# Patient Record
Sex: MALE | Race: WHITE | NOT HISPANIC OR LATINO
[De-identification: names, ages, dates, MRNs, and addresses within clinical notes are randomized per-mention and may not be internally consistent; named-entity substitution may affect disease eponyms.]

---

## 2017-03-21 ENCOUNTER — RECORDS - HEALTHEAST (OUTPATIENT)
Dept: ADMINISTRATIVE | Facility: OTHER | Age: 82
End: 2017-03-21

## 2017-04-07 ENCOUNTER — RECORDS - HEALTHEAST (OUTPATIENT)
Dept: GENERAL RADIOLOGY | Facility: CLINIC | Age: 82
End: 2017-04-07

## 2017-04-07 ENCOUNTER — OFFICE VISIT - HEALTHEAST (OUTPATIENT)
Dept: FAMILY MEDICINE | Facility: CLINIC | Age: 82
End: 2017-04-07

## 2017-04-07 DIAGNOSIS — L57.0 ACTINIC KERATOSIS: ICD-10-CM

## 2017-04-07 DIAGNOSIS — Z51.81 MEDICATION MONITORING ENCOUNTER: ICD-10-CM

## 2017-04-07 DIAGNOSIS — I63.512 CEREBRAL INFARCTION DUE TO OCCLUSION OF LEFT MIDDLE CEREBRAL ARTERY (H): ICD-10-CM

## 2017-04-07 DIAGNOSIS — I10 ESSENTIAL HYPERTENSION WITH GOAL BLOOD PRESSURE LESS THAN 140/90: ICD-10-CM

## 2017-04-07 DIAGNOSIS — K13.0 MUCOCELE OF LIP: ICD-10-CM

## 2017-04-07 DIAGNOSIS — I10 UNSPECIFIED ESSENTIAL HYPERTENSION: ICD-10-CM

## 2017-04-07 DIAGNOSIS — M17.9 OSTEOARTHRITIS OF KNEE, UNSPECIFIED: ICD-10-CM

## 2017-04-07 DIAGNOSIS — R73.01 IMPAIRED FASTING GLUCOSE: ICD-10-CM

## 2017-04-09 ENCOUNTER — COMMUNICATION - HEALTHEAST (OUTPATIENT)
Dept: FAMILY MEDICINE | Facility: CLINIC | Age: 82
End: 2017-04-09

## 2017-04-10 ENCOUNTER — RECORDS - HEALTHEAST (OUTPATIENT)
Dept: ADMINISTRATIVE | Facility: OTHER | Age: 82
End: 2017-04-10

## 2017-10-06 ENCOUNTER — COMMUNICATION - HEALTHEAST (OUTPATIENT)
Dept: FAMILY MEDICINE | Facility: CLINIC | Age: 82
End: 2017-10-06

## 2017-10-06 ENCOUNTER — OFFICE VISIT - HEALTHEAST (OUTPATIENT)
Dept: FAMILY MEDICINE | Facility: CLINIC | Age: 82
End: 2017-10-06

## 2017-10-06 DIAGNOSIS — E66.3 OVERWEIGHT (BMI 25.0-29.9): ICD-10-CM

## 2017-10-06 DIAGNOSIS — Z23 NEED FOR IMMUNIZATION AGAINST INFLUENZA: ICD-10-CM

## 2017-10-06 DIAGNOSIS — I10 ESSENTIAL HYPERTENSION WITH GOAL BLOOD PRESSURE LESS THAN 140/90: ICD-10-CM

## 2017-10-06 DIAGNOSIS — E78.5 DYSLIPIDEMIA: ICD-10-CM

## 2017-10-06 DIAGNOSIS — Z00.01 ENCOUNTER FOR GENERAL ADULT MEDICAL EXAMINATION WITH ABNORMAL FINDINGS: ICD-10-CM

## 2017-10-06 DIAGNOSIS — R73.01 IMPAIRED FASTING GLUCOSE: ICD-10-CM

## 2017-10-06 DIAGNOSIS — I63.512 CEREBRAL INFARCTION DUE TO OCCLUSION OF LEFT MIDDLE CEREBRAL ARTERY (H): ICD-10-CM

## 2017-10-06 DIAGNOSIS — I71.40 ANEURYSM OF ABDOMINAL VESSEL (H): ICD-10-CM

## 2017-10-06 LAB
CHOLEST SERPL-MCNC: 118 MG/DL
FASTING STATUS PATIENT QL REPORTED: YES
HDLC SERPL-MCNC: 42 MG/DL
LDLC SERPL CALC-MCNC: 60 MG/DL
TRIGL SERPL-MCNC: 80 MG/DL

## 2017-10-06 ASSESSMENT — MIFFLIN-ST. JEOR: SCORE: 1388.97

## 2017-10-10 ENCOUNTER — HOSPITAL ENCOUNTER (OUTPATIENT)
Dept: ULTRASOUND IMAGING | Facility: CLINIC | Age: 82
Discharge: HOME | End: 2017-10-10
Attending: FAMILY MEDICINE

## 2017-10-10 DIAGNOSIS — I71.40 ANEURYSM OF ABDOMINAL VESSEL (H): ICD-10-CM

## 2017-10-11 ENCOUNTER — COMMUNICATION - HEALTHEAST (OUTPATIENT)
Dept: FAMILY MEDICINE | Facility: CLINIC | Age: 82
End: 2017-10-11

## 2017-10-26 ENCOUNTER — COMMUNICATION - HEALTHEAST (OUTPATIENT)
Dept: FAMILY MEDICINE | Facility: CLINIC | Age: 82
End: 2017-10-26

## 2017-10-26 DIAGNOSIS — I10 ESSENTIAL HYPERTENSION: ICD-10-CM

## 2018-04-02 ENCOUNTER — RECORDS - HEALTHEAST (OUTPATIENT)
Dept: ADMINISTRATIVE | Facility: OTHER | Age: 83
End: 2018-04-02

## 2018-04-10 ENCOUNTER — COMMUNICATION - HEALTHEAST (OUTPATIENT)
Dept: FAMILY MEDICINE | Facility: CLINIC | Age: 83
End: 2018-04-10

## 2018-04-10 ENCOUNTER — OFFICE VISIT - HEALTHEAST (OUTPATIENT)
Dept: FAMILY MEDICINE | Facility: CLINIC | Age: 83
End: 2018-04-10

## 2018-04-10 DIAGNOSIS — R73.01 IMPAIRED FASTING GLUCOSE: ICD-10-CM

## 2018-04-10 DIAGNOSIS — I10 ESSENTIAL HYPERTENSION WITH GOAL BLOOD PRESSURE LESS THAN 140/90: ICD-10-CM

## 2018-04-10 DIAGNOSIS — I63.512 CEREBRAL INFARCTION DUE TO OCCLUSION OF LEFT MIDDLE CEREBRAL ARTERY (H): ICD-10-CM

## 2018-04-10 DIAGNOSIS — E78.5 DYSLIPIDEMIA: ICD-10-CM

## 2018-04-10 LAB
ANION GAP SERPL CALCULATED.3IONS-SCNC: 10 MMOL/L (ref 5–18)
BUN SERPL-MCNC: 20 MG/DL (ref 8–28)
CALCIUM SERPL-MCNC: 10 MG/DL (ref 8.5–10.5)
CHLORIDE BLD-SCNC: 103 MMOL/L (ref 98–107)
CHOLEST SERPL-MCNC: 115 MG/DL
CO2 SERPL-SCNC: 28 MMOL/L (ref 22–31)
CREAT SERPL-MCNC: 1.19 MG/DL (ref 0.7–1.3)
FASTING STATUS PATIENT QL REPORTED: YES
GFR SERPL CREATININE-BSD FRML MDRD: 58 ML/MIN/1.73M2
GLUCOSE BLD-MCNC: 95 MG/DL (ref 70–125)
HBA1C MFR BLD: 5.8 % (ref 3.5–6)
HDLC SERPL-MCNC: 47 MG/DL
LDLC SERPL CALC-MCNC: 60 MG/DL
POTASSIUM BLD-SCNC: 4.1 MMOL/L (ref 3.5–5)
SODIUM SERPL-SCNC: 141 MMOL/L (ref 136–145)
TRIGL SERPL-MCNC: 38 MG/DL

## 2018-04-30 ENCOUNTER — COMMUNICATION - HEALTHEAST (OUTPATIENT)
Dept: FAMILY MEDICINE | Facility: CLINIC | Age: 83
End: 2018-04-30

## 2018-04-30 DIAGNOSIS — I10 ESSENTIAL HYPERTENSION: ICD-10-CM

## 2018-10-12 ENCOUNTER — OFFICE VISIT - HEALTHEAST (OUTPATIENT)
Dept: FAMILY MEDICINE | Facility: CLINIC | Age: 83
End: 2018-10-12

## 2018-10-12 DIAGNOSIS — Z23 NEED FOR VACCINATION: ICD-10-CM

## 2018-10-12 DIAGNOSIS — I63.512 CEREBRAL INFARCTION DUE TO OCCLUSION OF LEFT MIDDLE CEREBRAL ARTERY (H): ICD-10-CM

## 2018-10-12 DIAGNOSIS — N28.9 RENAL INSUFFICIENCY: ICD-10-CM

## 2018-10-12 DIAGNOSIS — E78.5 DYSLIPIDEMIA: ICD-10-CM

## 2018-10-12 DIAGNOSIS — E66.3 OVERWEIGHT (BMI 25.0-29.9): ICD-10-CM

## 2018-10-12 DIAGNOSIS — R47.01 APHASIA: ICD-10-CM

## 2018-10-12 DIAGNOSIS — I10 ESSENTIAL HYPERTENSION WITH GOAL BLOOD PRESSURE LESS THAN 140/90: ICD-10-CM

## 2018-10-12 DIAGNOSIS — Z00.01 ENCOUNTER FOR GENERAL ADULT MEDICAL EXAMINATION WITH ABNORMAL FINDINGS: ICD-10-CM

## 2018-10-12 DIAGNOSIS — M17.0 OSTEOARTHRITIS OF BOTH KNEES, UNSPECIFIED OSTEOARTHRITIS TYPE: ICD-10-CM

## 2018-10-12 DIAGNOSIS — R73.01 IMPAIRED FASTING GLUCOSE: ICD-10-CM

## 2018-10-12 DIAGNOSIS — H91.93 BILATERAL HEARING LOSS, UNSPECIFIED HEARING LOSS TYPE: ICD-10-CM

## 2018-10-12 LAB
ANION GAP SERPL CALCULATED.3IONS-SCNC: 12 MMOL/L (ref 5–18)
BUN SERPL-MCNC: 14 MG/DL (ref 8–28)
CALCIUM SERPL-MCNC: 10.3 MG/DL (ref 8.5–10.5)
CHLORIDE BLD-SCNC: 104 MMOL/L (ref 98–107)
CHOLEST SERPL-MCNC: 127 MG/DL
CO2 SERPL-SCNC: 28 MMOL/L (ref 22–31)
CREAT SERPL-MCNC: 0.98 MG/DL (ref 0.7–1.3)
FASTING STATUS PATIENT QL REPORTED: YES
GFR SERPL CREATININE-BSD FRML MDRD: >60 ML/MIN/1.73M2
GLUCOSE BLD-MCNC: 103 MG/DL (ref 70–125)
HBA1C MFR BLD: 5.7 % (ref 3.5–6)
HDLC SERPL-MCNC: 47 MG/DL
LDLC SERPL CALC-MCNC: 63 MG/DL
POTASSIUM BLD-SCNC: 4.1 MMOL/L (ref 3.5–5)
SODIUM SERPL-SCNC: 144 MMOL/L (ref 136–145)
TRIGL SERPL-MCNC: 87 MG/DL

## 2018-10-12 RX ORDER — ASPIRIN 81 MG/1
81 TABLET, CHEWABLE ORAL DAILY
Qty: 30 TABLET | Refills: 11 | Status: SHIPPED | COMMUNITY
Start: 2018-10-12 | End: 2020-02-16

## 2018-10-12 ASSESSMENT — MIFFLIN-ST. JEOR: SCORE: 1352.68

## 2018-10-15 ENCOUNTER — COMMUNICATION - HEALTHEAST (OUTPATIENT)
Dept: FAMILY MEDICINE | Facility: CLINIC | Age: 83
End: 2018-10-15

## 2019-04-12 ENCOUNTER — OFFICE VISIT - HEALTHEAST (OUTPATIENT)
Dept: FAMILY MEDICINE | Facility: CLINIC | Age: 84
End: 2019-04-12

## 2019-04-12 DIAGNOSIS — E78.5 DYSLIPIDEMIA: ICD-10-CM

## 2019-04-12 DIAGNOSIS — I63.512 CEREBRAL INFARCTION DUE TO OCCLUSION OF LEFT MIDDLE CEREBRAL ARTERY (H): ICD-10-CM

## 2019-04-12 DIAGNOSIS — R73.01 IMPAIRED FASTING GLUCOSE: ICD-10-CM

## 2019-04-12 DIAGNOSIS — I10 ESSENTIAL HYPERTENSION WITH GOAL BLOOD PRESSURE LESS THAN 140/90: ICD-10-CM

## 2019-04-12 DIAGNOSIS — E66.3 OVERWEIGHT (BMI 25.0-29.9): ICD-10-CM

## 2019-04-12 LAB
ANION GAP SERPL CALCULATED.3IONS-SCNC: 9 MMOL/L (ref 5–18)
BUN SERPL-MCNC: 20 MG/DL (ref 8–28)
CALCIUM SERPL-MCNC: 10.4 MG/DL (ref 8.5–10.5)
CHLORIDE BLD-SCNC: 104 MMOL/L (ref 98–107)
CHOLEST SERPL-MCNC: 124 MG/DL
CO2 SERPL-SCNC: 29 MMOL/L (ref 22–31)
CREAT SERPL-MCNC: 0.97 MG/DL (ref 0.7–1.3)
FASTING STATUS PATIENT QL REPORTED: YES
GFR SERPL CREATININE-BSD FRML MDRD: >60 ML/MIN/1.73M2
GLUCOSE BLD-MCNC: 99 MG/DL (ref 70–125)
HBA1C MFR BLD: 5.5 % (ref 3.5–6)
HDLC SERPL-MCNC: 47 MG/DL
LDLC SERPL CALC-MCNC: 59 MG/DL
POTASSIUM BLD-SCNC: 4.1 MMOL/L (ref 3.5–5)
SODIUM SERPL-SCNC: 142 MMOL/L (ref 136–145)
TRIGL SERPL-MCNC: 88 MG/DL

## 2019-04-13 ENCOUNTER — COMMUNICATION - HEALTHEAST (OUTPATIENT)
Dept: FAMILY MEDICINE | Facility: CLINIC | Age: 84
End: 2019-04-13

## 2019-10-18 ENCOUNTER — OFFICE VISIT - HEALTHEAST (OUTPATIENT)
Dept: FAMILY MEDICINE | Facility: CLINIC | Age: 84
End: 2019-10-18

## 2019-10-18 DIAGNOSIS — I63.512 CEREBRAL INFARCTION DUE TO OCCLUSION OF LEFT MIDDLE CEREBRAL ARTERY (H): ICD-10-CM

## 2019-10-18 DIAGNOSIS — Z00.01 ENCOUNTER FOR GENERAL ADULT MEDICAL EXAMINATION WITH ABNORMAL FINDINGS: ICD-10-CM

## 2019-10-18 DIAGNOSIS — I10 ESSENTIAL HYPERTENSION WITH GOAL BLOOD PRESSURE LESS THAN 140/90: ICD-10-CM

## 2019-10-18 DIAGNOSIS — H91.93 BILATERAL HEARING LOSS, UNSPECIFIED HEARING LOSS TYPE: ICD-10-CM

## 2019-10-18 DIAGNOSIS — R73.01 IMPAIRED FASTING GLUCOSE: ICD-10-CM

## 2019-10-18 DIAGNOSIS — I71.40 ANEURYSM OF ABDOMINAL VESSEL (H): ICD-10-CM

## 2019-10-18 DIAGNOSIS — E78.5 DYSLIPIDEMIA: ICD-10-CM

## 2019-10-18 DIAGNOSIS — E66.3 OVERWEIGHT (BMI 25.0-29.9): ICD-10-CM

## 2019-10-18 LAB
ANION GAP SERPL CALCULATED.3IONS-SCNC: 9 MMOL/L (ref 5–18)
BUN SERPL-MCNC: 18 MG/DL (ref 8–28)
CALCIUM SERPL-MCNC: 10.3 MG/DL (ref 8.5–10.5)
CHLORIDE BLD-SCNC: 106 MMOL/L (ref 98–107)
CHOLEST SERPL-MCNC: 124 MG/DL
CO2 SERPL-SCNC: 28 MMOL/L (ref 22–31)
CREAT SERPL-MCNC: 1.09 MG/DL (ref 0.7–1.3)
FASTING STATUS PATIENT QL REPORTED: YES
GFR SERPL CREATININE-BSD FRML MDRD: >60 ML/MIN/1.73M2
GLUCOSE BLD-MCNC: 109 MG/DL (ref 70–125)
HBA1C MFR BLD: 5.5 % (ref 3.5–6)
HDLC SERPL-MCNC: 49 MG/DL
LDLC SERPL CALC-MCNC: 59 MG/DL
POTASSIUM BLD-SCNC: 4 MMOL/L (ref 3.5–5)
SODIUM SERPL-SCNC: 143 MMOL/L (ref 136–145)
TRIGL SERPL-MCNC: 78 MG/DL

## 2019-10-18 RX ORDER — AMLODIPINE BESYLATE 10 MG/1
10 TABLET ORAL DAILY
Qty: 90 TABLET | Refills: 3 | Status: SHIPPED | OUTPATIENT
Start: 2019-10-18

## 2019-10-18 RX ORDER — SIMVASTATIN 20 MG
20 TABLET ORAL DAILY
Qty: 90 TABLET | Refills: 3 | Status: SHIPPED | OUTPATIENT
Start: 2019-10-18

## 2019-10-18 RX ORDER — HYDROCHLOROTHIAZIDE 25 MG/1
25 TABLET ORAL DAILY
Qty: 90 TABLET | Refills: 3 | Status: SHIPPED | OUTPATIENT
Start: 2019-10-18

## 2019-10-18 ASSESSMENT — MIFFLIN-ST. JEOR: SCORE: 1342.91

## 2019-10-18 ASSESSMENT — ANXIETY QUESTIONNAIRES
1. FEELING NERVOUS, ANXIOUS, OR ON EDGE: NOT AT ALL
2. NOT BEING ABLE TO STOP OR CONTROL WORRYING: NOT AT ALL

## 2019-10-21 ENCOUNTER — COMMUNICATION - HEALTHEAST (OUTPATIENT)
Dept: FAMILY MEDICINE | Facility: CLINIC | Age: 84
End: 2019-10-21

## 2019-10-21 ENCOUNTER — RECORDS - HEALTHEAST (OUTPATIENT)
Dept: VASCULAR ULTRASOUND | Facility: CLINIC | Age: 84
End: 2019-10-21

## 2019-10-21 DIAGNOSIS — I71.40 ABDOMINAL AORTIC ANEURYSM, WITHOUT RUPTURE: ICD-10-CM

## 2020-02-24 ENCOUNTER — COMMUNICATION - HEALTHEAST (OUTPATIENT)
Dept: FAMILY MEDICINE | Facility: CLINIC | Age: 85
End: 2020-02-24

## 2020-02-25 ENCOUNTER — COMMUNICATION - HEALTHEAST (OUTPATIENT)
Dept: FAMILY MEDICINE | Facility: CLINIC | Age: 85
End: 2020-02-25

## 2021-05-27 ENCOUNTER — RECORDS - HEALTHEAST (OUTPATIENT)
Dept: ADMINISTRATIVE | Facility: CLINIC | Age: 86
End: 2021-05-27

## 2021-05-27 NOTE — PROGRESS NOTES
Assessment/Plan:    1. Essential hypertension with goal blood pressure less than 140/90  Hypertension, stable.  Blood pressure 120/70 on recheck.  Refill on amlodipine 10 mg daily and hydrochlorothiazide 25 mg daily.  Basic metabolic panel for medication monitoring.  Has had mild renal insufficiency intermittently previously.  Ensure adequate hydration.  Reassess at annual wellness visit in 6 months.  - Basic Metabolic Panel  - hydroCHLOROthiazide (HYDRODIURIL) 25 MG tablet; Take 1 tablet (25 mg total) by mouth daily.  Dispense: 90 tablet; Refill: 3  - amLODIPine (NORVASC) 10 MG tablet; Take 1 tablet (10 mg total) by mouth daily.  Dispense: 90 tablet; Refill: 3    2. Impaired fasting glucose  Impaired fasting glucose history.  Prior A1c improved from 5.8% down to 5.7%.  Repeat A1c and fasting glucose with goal less than 100.  Therapeutic lifestyle changes for weight goal remaining less than 160 pounds initially, less than 155 pounds ideally.  - Basic Metabolic Panel  - Glycosylated Hemoglobin A1c    3. Dyslipidemia  History of dyslipidemia with history of left middle cerebral artery CVA January 2016 without residual deficit.  Lipid cascade obtained today.  Continue simvastatin 20 mg at bedtime.  - Lipid Cascade  - simvastatin (ZOCOR) 20 MG tablet; Take 1 tablet (20 mg total) by mouth daily.  Dispense: 90 tablet; Refill: 3    4. Overweight (BMI 25.0-29.9)  Therapeutic lifestyle changes reviewed for weight goal remaining less than 160 pounds initially, less than 155 pounds ideally.    5. 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  No recurrent concerns or residual deficits.      The following high BMI interventions were performed this visit: encouragement to exercise, weight monitoring, weight loss from baseline weight and lifestyle education regarding diet.  Ensure ongoing efforts to achieve weight goal < 160 pounds initially, < 155 pounds ideally.  "        Subjective:    Daryl Casey is seen today for follow-up evaluation.  History of hypertension.  Continues amlodipine 10 mg daily and hydrochlorothiazide 25 mg daily.  No side effects of medication.  No dizziness.  History of left middle cerebral artery CVA in 2016 without residual deficit.  Continues aspirin 81 mg daily.  Using simvastatin 20 mg at bedtime for management of dyslipidemia.  Has had mild renal insufficiency intermittently in the past however most recent renal function appeared normal.  Weight relatively stable since prior physical 2018.  Denies chest pain.  Rash involving right anterior shin distally.  Improved with hydrocortisone cream.  Mild pruritus initially.  No other skin involvement.  Comprehensive review of systems as above otherwise all negative.     \"Kacey\" x [birth year]   5 children   Retired - product supervisor   [employer]   Mom - [facility] dec 91   Dad - dec 75 ( in [facility])   3 bros - DM, HTN   Identical twin brother   No smoke  Infrequent EtOH (wine) - glass on holidays   Hospitalizations: 3/03 - [facility] for rectal bleeding; 16 admitted to the hospital for confusion and aphasia and noted to have a left middle cerebral artery stroke  Surgeries: s/p left knee surgery ~ 40 years ago; s/p suprapubic prostatectomy 08 - pathology without cancer; s/p right inferior eyelid eyelash surgery; s/p kidney stone removal on cystoscopy in office by Dr. Martel 11/3/11; left eye cataract 12; right eye cataract 12; 1/23/15 bilateral blepharoplasty  Colonoscopy 3/24/03 (repeat in 10 years)   Uses bilateral hearing aids   3 cm infrarenal AAA noted on CT scan 11/3/2011 -> follow with repeat U/S monitoring in 2-3 years to ensure stable (ultrasound on 10/18/13 showing stable 3.2 cm infrarenal AAA)  H/O [facility] Shingles study     16 FYI: Please note that Mr. Casey was admitted to the hospital for confusion and aphasia and noted to have a left " middle cerebral artery stroke. He is actually improving nicely and his aphasia is much improved he is going to go to [facility] for transitional care and will follow up with you afterwards. I did ask that [facility] follow while he is there. We ve kept his blood pressure medications the same, we added aspirin on a daily basis we also started him on a statin medication he will need his lipid profile checked in about six weeks. He will follow with Dr. Sneed in approximately four weeks. If you have any questions you can contact the hospitalist coordinator at [phone] and have me paged. Dr. Finney    Past Surgical History:   Procedure Laterality Date     CATARACT EXTRACTION Bilateral      Eyelid Surgery Right      KNEE SURGERY Left      PROSTATE SURGERY       PROSTATE SURGERY          Family History   Problem Relation Age of Onset     Heart disease Mother      Diabetes Brother      Hypertension Brother         Past Medical History:   Diagnosis Date     Aneurysm of infrarenal abdominal aorta (H)      BPH (benign prostatic hyperplasia)      Cataract     prior surgery     Diverticulosis      [place] (hard of hearing)      Hypertension      Insomnia      OA (osteoarthritis) of knee      PVC (premature ventricular contraction)      Right inguinal hernia         Social History     Tobacco Use     Smoking status: Former Smoker     Last attempt to quit: 1965     Years since quittin.2     Smokeless tobacco: Never Used   Substance Use Topics     Alcohol use: Yes     Comment: On occasions     Drug use: No        Current Outpatient Medications   Medication Sig Dispense Refill     acetaminophen (TYLENOL) 325 MG tablet Take 650 mg by mouth every 4 (four) hours as needed for pain.       amLODIPine (NORVASC) 10 MG tablet Take 1 tablet (10 mg total) by mouth daily. 90 tablet 3     ASCORBATE CALCIUM (VITAMIN C ORAL) Take 250 mg by mouth daily.        calcium, as carbonate, (OS-ALMA) 500 mg  calcium (1,250 mg) tablet Take 1 tablet by mouth daily.        cyanocobalamin (VITAMIN B-12) 1000 MCG tablet Take 1,000 mcg by mouth daily. As directed.       hydroCHLOROthiazide (HYDRODIURIL) 25 MG tablet Take 1 tablet (25 mg total) by mouth daily. 90 tablet 3     multivitamin therapeutic tablet Take 1 tablet by mouth daily.       simvastatin (ZOCOR) 20 MG tablet Take 1 tablet (20 mg total) by mouth daily. 90 tablet 3     aspirin 81 mg chewable tablet Chew 1 tablet (81 mg total) daily. 30 tablet 11     No current facility-administered medications for this visit.           Objective:    Vitals:    04/12/19 0712   BP: 120/70   Pulse: 80   SpO2: 97%   Weight: 163 lb (73.9 kg)      Body mass index is 26.51 kg/m .    Alert.  No apparent distress.  Right anterior shin distal aspect with erythematous rash without excoriation or drainage.  No peripheral edema.  Chest clear.  Cardiac exam regular with blood pressure 120/70 on recheck as well.      This note has been dictated using voice recognition software and as a result may contain minor grammatical errors and unintended word substitutions.

## 2021-05-30 VITALS — WEIGHT: 175 LBS | BODY MASS INDEX: 26.61 KG/M2

## 2021-05-31 VITALS — BODY MASS INDEX: 27.64 KG/M2 | HEIGHT: 66 IN | WEIGHT: 172 LBS

## 2021-06-01 VITALS — BODY MASS INDEX: 27.32 KG/M2 | WEIGHT: 168 LBS

## 2021-06-02 VITALS — BODY MASS INDEX: 26.51 KG/M2 | WEIGHT: 163 LBS

## 2021-06-02 VITALS — BODY MASS INDEX: 26.36 KG/M2 | HEIGHT: 66 IN | WEIGHT: 164 LBS

## 2021-06-02 NOTE — PROGRESS NOTES
Right knee Injection  Date/Time: 10/18/2019 8:21 AM  Performed by: Darren Tripathi MD  Authorized by: Darren Tripathi MD       Universal Protocol    Site marked: Yes    Prior images obtained and reviewed: Yes    Required items: required blood products, implants, devices, and special equipment available    Patient identity confirmed: verbally with patient    Reevaluation: Patient was reevaluated immediately before administering moderate or deep sedation or anesthesia    Confirmation checklist: patient's identity using two indicators    Time out: Immediately prior to procedure a time out was called to verify the correct patient, procedure, equipment, support staff and site/side marked as required    Universal Protocol: Joint Commission Universal Protocol was followed    Preparation: Patient was prepped and draped in the usual sterile fashion    Indications  Indications: pain     Location  Body area: knee  Joint: right knee      Anesthesia    Local anesthesia used?: Yes    Local anesthetic: lidocaine 1% without epinephrine    Anesthetic total (mL): 2    Sedation  Patient sedation: No    Procedure Details  Needle size: 22 G  Ultrasound guidance: no  Approach: superior  Methylprednisolone amount: 80 mg  Lidocaine 1% amount: 3 mL      Post-procedure   Length of time physician present for 1:1 monitoring during sedation: 0

## 2021-06-02 NOTE — TELEPHONE ENCOUNTER
Reason contacted:  Results   Information relayed:  Below message relayed to patients spouse per patient request.   Additional questions:  No  Further follow-up needed:  No  Okay to leave a detailed message:  No

## 2021-06-02 NOTE — PROGRESS NOTES
Assessment and Plan:       1. Encounter for general adult medical examination with abnormal findings  Annual wellness visit completed.  Risks associate with hearing loss for which patient utilizes bilateral hearing aids otherwise no significant risks identified.  High-dose flu shot provided.  Did recommend Shingrix immunization series through local pharmacy.  Annual wellness visits to continue.  - Influenza High Dose, Seasonal 65+ yrs    2. Overweight (BMI 25.0-29.9)  Dietary and exercise modification for weight goal less than 155 pounds ideally.    3. Essential hypertension with goal blood pressure less than 140/90  Did provide refills on amlodipine 10 mg daily and hydrochlorothiazide 25 mg daily for hypertension management.  Base metabolic panel for med monitoring.  - Basic Metabolic Panel  - amLODIPine (NORVASC) 10 MG tablet; Take 1 tablet (10 mg total) by mouth daily.  Dispense: 90 tablet; Refill: 3  - hydroCHLOROthiazide (HYDRODIURIL) 25 MG tablet; Take 1 tablet (25 mg total) by mouth daily.  Dispense: 90 tablet; Refill: 3    4. Impaired fasting glucose  History of impaired fasting glucose.  Repeat fasting glucose and A1c.  Maintain weight less than 155 pounds ideally.  - Basic Metabolic Panel  - Glycosylated Hemoglobin A1c    5. Dyslipidemia  History of dyslipidemia.  Simvastatin 20 mg at bedtime refilled.  Check lipid cascade.  - Lipid Cascade  - simvastatin (ZOCOR) 20 MG tablet; Take 1 tablet (20 mg total) by mouth daily.  Dispense: 90 tablet; Refill: 3    6. 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  Continues aspirin 81 mg daily.    7. Bilateral hearing loss, unspecified hearing loss type  Bilateral hearing aids utilized.    8. Osteoarthritis Of The Knee  Bilateral knee osteoarthritis.  Right knee pain described.  Has had corticosteroid injections in the past the orthopedic  specialist.  Request corticosteroid injection today.  Please see procedure note.  - methylPREDNISolone acetate injection 80 mg (DEPO-MEDROL)  - Right knee Injection    9. Aneurysm Of The Infrarenal Abdominal Aorta  History of 3.6 cm infrarenal AAA.  Most recent ultrasound 2017.  Repeat ultrasound scheduled.  - US Abdominal Aorta; Future        The patient's current medical problems were reviewed.    I have had an Advance Directives discussion with the patient.  The following high BMI interventions were performed this visit: encouragement to exercise, weight monitoring, weight loss from baseline weight and lifestyle education regarding diet.  Ensure ongoing efforts to achieve weight goal < 155 pounds ideally.     The following health maintenance schedule was reviewed with the patient and provided in printed form in the after visit summary:   Health Maintenance   Topic Date Due     ZOSTER VACCINES (2 of 3) 02/26/2003     INFLUENZA VACCINE RULE BASED (1) 08/01/2019     MEDICARE ANNUAL WELLNESS VISIT  10/12/2019     FALL RISK ASSESSMENT  10/18/2020     ADVANCE CARE PLANNING  10/12/2023     TD 18+ HE  10/12/2028     PNEUMOCOCCAL IMMUNIZATION 65+ LOW/MEDIUM RISK  Completed        Subjective:     Chief Complaint: Daryl Casey is an 86 y.o. male here for an Annual Wellness visit.     HPI: Patient seen today for annual wellness visit.  Wants steroid injection for right knee pain associate with known osteoarthritis.  Has received injections previously through the orthopedic specialist office likely greater than 1 year ago.  No history of hypertension.  Did have left middle cerebral artery CVA January 2016 without any residual concerns at this time.  Continues aspirin 81 mg daily.  Continues amlodipine 10 mg and hydrochlorothiazide 25 mg daily.  Simvastatin 20 mg at bedtime for lipid management with dyslipidemia history.  3.6 cm infrarenal AAA previously with most recent ultrasound 2017.  Hearing loss utilizes bilateral  "hearing aids.  Long-standing ventral hernia without significant distress.  Impaired fasting glucose history.  Has lost some weight since prior visit with goal less than 155 pounds reviewed.  4 pound weight loss since 2019.    Review of Systems:  Please see above.  The rest of the review of systems are negative for all systems.     \"Kacey\" x [birth year]   5 children   Retired - product supervisor   [employer]   Mom - CHF dec 91   Dad - dec 75 ( in [facility])   3 bros originally - DM, HTN; two  (one from lung CA, other at age 90 \"old age\")  Identical twin brother  No smoke  Infrequent EtOH (wine) - glass on holidays   Hospitalizations: 3/03 - [facility] for rectal bleeding; 16 admitted to the hospital for confusion and aphasia and noted to have a left middle cerebral artery stroke  Surgeries: s/p left knee surgery ~ 40 years ago; s/p suprapubic prostatectomy 08 - pathology without cancer; s/p right inferior eyelid eyelash surgery; s/p kidney stone removal on cystoscopy in office by Dr. Martel 11/3/11; left eye cataract 12; right eye cataract 12; 1/23/15 bilateral blepharoplasty  Colonoscopy 3/24/03 (repeat in 10 years)   Uses bilateral hearing aids   3 cm infrarenal AAA noted on CT scan 11/3/2011 -> follow with repeat U/S monitoring in 2-3 years to ensure stable (ultrasound on 10/18/13 showing stable 3.2 cm infrarenal AAA)  H/O [facility] study     Patient Care Team:  Darren Tripathi MD as PCP - General  Darren Tripathi MD as Assigned PCP     Patient Active Problem List   Diagnosis     Bilateral hearing loss, unspecified hearing loss type     Diverticulosis     Ventral Hernia     Essential hypertension with goal blood pressure less than 140/90     Aneurysm Of The Infrarenal Abdominal Aorta     Right Inguinal Hernia     Nephrolithiasis     Impaired Fasting Glucose     Scrotal Varicocele     Overweight (BMI 25.0-29.9)     Aphasia     Cerebral infarction due to " occlusion of left middle cerebral artery (H)     Insomnia     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     Encephalopathy     PVC (premature ventricular contraction)     Osteoarthritis of both knees, unspecified osteoarthritis type     Dyslipidemia     Renal insufficiency     Past Medical History:   Diagnosis Date     Aneurysm of infrarenal abdominal aorta (H)      BPH (benign prostatic hyperplasia)      Cataract     prior surgery     Diverticulosis      [place] (hard of hearing)      Hypertension      Insomnia      OA (osteoarthritis) of knee      PVC (premature ventricular contraction)      Right inguinal hernia       Past Surgical History:   Procedure Laterality Date     CATARACT EXTRACTION Bilateral      Eyelid Surgery Right      KNEE SURGERY Left      PROSTATE SURGERY       PROSTATE SURGERY        Family History   Problem Relation Age of Onset     Heart disease Mother      Diabetes Brother      Hypertension Brother       Social History     Socioeconomic History     Marital status:      Spouse name: Not on file     Number of children: Not on file     Years of education: Not on file     Highest education level: Not on file   Occupational History     Not on file   Social Needs     Financial resource strain: Not on file     Food insecurity:     Worry: Not on file     Inability: Not on file     Transportation needs:     Medical: Not on file     Non-medical: Not on file   Tobacco Use     Smoking status: Former Smoker     Last attempt to quit: 1965     Years since quittin.7     Smokeless tobacco: Never Used   Substance and Sexual Activity     Alcohol use: Yes     Comment: On occasions     Drug use: No     Sexual activity: Not on file   Lifestyle     Physical activity:     Days per week: Not on file     Minutes per session: Not on file     Stress: Not on file   Relationships     Social connections:     Talks on phone: Not on file     Gets together: Not on file     Attends Mosque  "service: Not on file     Active member of club or organization: Not on file     Attends meetings of clubs or organizations: Not on file     Relationship status: Not on file     Intimate partner violence:     Fear of current or ex partner: Not on file     Emotionally abused: Not on file     Physically abused: Not on file     Forced sexual activity: Not on file   Other Topics Concern     Not on file   Social History Narrative     Not on file      Current Outpatient Medications   Medication Sig Dispense Refill     acetaminophen (TYLENOL) 325 MG tablet Take 650 mg by mouth every 4 (four) hours as needed for pain.       amLODIPine (NORVASC) 10 MG tablet Take 1 tablet (10 mg total) by mouth daily. 90 tablet 3     ASCORBATE CALCIUM (VITAMIN C ORAL) Take 250 mg by mouth daily.        calcium, as carbonate, (OS-ALMA) 500 mg calcium (1,250 mg) tablet Take 1 tablet by mouth daily.        cyanocobalamin (VITAMIN B-12) 1000 MCG tablet Take 1,000 mcg by mouth daily. As directed.       hydroCHLOROthiazide (HYDRODIURIL) 25 MG tablet Take 1 tablet (25 mg total) by mouth daily. 90 tablet 3     multivitamin therapeutic tablet Take 1 tablet by mouth daily.       simvastatin (ZOCOR) 20 MG tablet Take 1 tablet (20 mg total) by mouth daily. 90 tablet 3     aspirin 81 mg chewable tablet Chew 1 tablet (81 mg total) daily. 30 tablet 11     No current facility-administered medications for this visit.       Objective:   Vital Signs:   Visit Vitals  /80 (Patient Site: Left Arm, Patient Position: Sitting, Cuff Size: Adult Regular)   Pulse 78   Ht 5' 6.54\" (1.69 m)   Wt 159 lb 1.6 oz (72.2 kg)   SpO2 95%   BMI 25.27 kg/m         VisionScreening:  No exam data present     PHYSICAL EXAM    General Appearance:    Alert, cooperative, no distress, appears stated age.     Head:    Normocephalic, without obvious abnormality, atraumatic   Eyes:    PERRL, conjunctiva/corneas clear, EOM's intact, fundi     benign, both eyes        Ears:    Normal TM's " and external ear canals, both ears.  Bilateral hearing aids in place.   Nose:   Nares normal, septum midline, mucosa normal, no drainage    or sinus tenderness   Throat:   Lips, mucosa, and tongue normal; teeth and gums normal   Neck:   Supple, symmetrical, trachea midline, no adenopathy;        thyroid:  No enlargement/tenderness/nodules; no carotid    bruit or JVD   Back:     Symmetric, no curvature, ROM normal, no CVA tenderness   Lungs:     Clear to auscultation bilaterally, respirations unlabored   Chest wall:    No tenderness or deformity   Heart:    Regular rate and rhythm, S1 and S2 normal, no murmur, rub   or gallop   Abdomen:     Soft, non-tender, bowel sounds active all four quadrants,     no masses, no organomegaly.  Ventral hernia, reducible.    Genitalia:    Normal male without lesion, discharge or tenderness.  No inguinal hernia noted.     Rectal:    Normal tone.  Prostate normal/symmetric, no masses or tenderness.   Extremities:   Extremities normal, atraumatic, no cyanosis or edema.  Bilateral knee osteoarthritic change noted symmetric without joint effusion.   Pulses:   2+ and symmetric all extremities   Skin:   Skin color, texture, turgor normal, no rashes or lesions   Lymph nodes:   Cervical, supraclavicular, and axillary nodes normal   Neurologic:   CNII-XII intact. Normal strength, sensation and reflexes       throughout        Assessment Results 10/18/2019   Activities of Daily Living No help needed   Instrumental Activities of Daily Living No help needed   Get Up and Go Score -   Mini Cog Total Score 5   Some recent data might be hidden     A Mini-Cog score of 0-2 suggests the possibility of dementia, score of 3-5 suggests no dementia    Identified Health Risks:     The patient was provided with written information regarding signs of hearing loss.  Patient's advanced directive was discussed and I am comfortable with the patient's wishes.

## 2021-06-02 NOTE — TELEPHONE ENCOUNTER
----- Message from Darren Tripathi MD sent at 10/21/2019  9:37 AM CDT -----  Notify patient that his ultrasound shows relatively stable aneurysm of aorta.  We will repeat this test in 2 years.

## 2021-06-03 VITALS
HEIGHT: 67 IN | DIASTOLIC BLOOD PRESSURE: 80 MMHG | OXYGEN SATURATION: 95 % | SYSTOLIC BLOOD PRESSURE: 120 MMHG | HEART RATE: 78 BPM | BODY MASS INDEX: 24.97 KG/M2 | WEIGHT: 159.1 LBS

## 2021-06-06 NOTE — TELEPHONE ENCOUNTER
FYI - Status Update  Who is Calling: Dai from [facility] Medical Examiner's office  Update: Caller was checking the status below. Caller was informed the message is still pending and waiting for Darren Tripathi MD to address. Caller was informed Darren Tripathi MD was not in yesterday but is here today.    Caller stated she wanted it noted that they are just waiting for Darren Tripathi MD to address if he will sign the death certificate.  Okay to leave a detailed message?:  No

## 2021-06-06 NOTE — TELEPHONE ENCOUNTER
Who is calling:  Zofia   Reason for Call:  Caller is requesting provider to dianna patients Death Certificate Online [facility] . Caller states his  is coming up and need to cremate tomorrow requesting to process as soon as possible today .  Date of last appointment with primary care: 10/18/19  Okay to leave a detailed message: No

## 2021-06-06 NOTE — TELEPHONE ENCOUNTER
Who is calling:  [facility] Medical Examiner  Reason for Call:   Patient was found  in his home on 20 at 1546.  Patient did pass away from natural causes.  Will Dr. Tripathi sign the death certificate?  Date of last appointment with primary care: 10/18/19  Okay to leave a detailed message: Yes

## 2021-06-06 NOTE — TELEPHONE ENCOUNTER
Spoke with Zofia.  Death certificate completed.  I also call patient's wife, Kacey, to extend my condolences and notify her that paperwork completed.

## 2021-06-09 NOTE — PROGRESS NOTES
"Subjective:    Daryl Casey is seen today for follow-up evaluation.  Underlying hypertension.  Continues amlodipine 10 mg daily and hydrochlorothiazide 25 mg daily.  Simvastatin 20 mg at bedtime.  Avoid higher doses due to potential drug interactions with amlodipine with rhabdomyolysis.  History of left middle cerebral artery CVA described with complete resolution of symptoms.  Continues aspirin 81 mg daily.  History of impaired fasting glucose with prior blood sugar 104 however most recent fasting glucose 99 2016 at time of lipid cascade with A1c of 5.7% at that time.  History of 3.2 cm AAA on ultrasound 2013.  Right knee pain noted.  Arthritis issues described without recent x-ray.  Scaliness noted on bridge of nose.  Also bit with a month or so ago and has a residual bump that he would like looked at.     \"Kacey\" x [birth year]   5 children   Retired - product supervisor   [employer]   Mom - [facility] dec 91   Dad - dec 75 ( in [facility])   3 bros - DM, HTN   Identical twin brother   No smoke  Infrequent EtOH (wine) - glass on holidays   Hospitalizations: 3/03 - [facility] for rectal bleeding; 16 admitted to the hospital for confusion and aphasia and noted to have a left middle cerebral artery stroke  Surgeries: s/p left knee surgery ~ 40 years ago; s/p suprapubic prostatectomy 08 - pathology without cancer; s/p right inferior eyelid eyelash surgery; s/p kidney stone removal on cystoscopy in office by Dr. Martel 11/3/11; left eye cataract 12; right eye cataract 12; 1/23/15 bilateral blepharoplasty  Colonoscopy 3/24/03 (repeat in 10 years)   Uses bilateral hearing aids   3 cm infrarenal AAA noted on CT scan 11/3/2011 -> follow with repeat U/S monitoring in 2-3 years to ensure stable (ultrasound on 10/18/13 showing stable 3.2 cm infrarenal AAA)  H/O [facility] study     Past Surgical History:   Procedure Laterality Date     CATARACT EXTRACTION Bilateral      " Eyelid Surgery Right      KNEE SURGERY Left      PROSTATE SURGERY       PROSTATE SURGERY          Family History   Problem Relation Age of Onset     Heart disease Mother      Diabetes Brother      Hypertension Brother         Past Medical History:   Diagnosis Date     Aneurysm of infrarenal abdominal aorta      BPH (benign prostatic hyperplasia)      Cataract     prior surgery     Diverticulosis      [place] (hard of hearing)      Hypertension      Insomnia      OA (osteoarthritis) of knee      PVC (premature ventricular contraction)      Right inguinal hernia         Social History   Substance Use Topics     Smoking status: Former Smoker     Quit date: 1/16/1965     Smokeless tobacco: None     Alcohol use Yes      Comment: On occasions        Current Outpatient Prescriptions   Medication Sig Dispense Refill     amLODIPine (NORVASC) 10 MG tablet TAKE ONE TABLET BY MOUTH ONCE DAILY 90 tablet 3     ASCORBATE CALCIUM (VITAMIN C ORAL) Take 250 mg by mouth daily.        calcium, as carbonate, (OS-ALMA) 500 mg calcium (1,250 mg) tablet Take 1 tablet by mouth daily.        cyanocobalamin (VITAMIN B-12) 1000 MCG tablet Take 1,000 mcg by mouth daily. As directed.       folic acid (FOLVITE) 400 MCG tablet Take 400 mcg by mouth daily. As directed.       hydroCHLOROthiazide (HYDRODIURIL) 25 MG tablet Take 1 tablet (25 mg total) by mouth daily. 90 tablet 3     PYRIDOXINE HCL (VITAMIN B-6 ORAL) Take 1 tablet by mouth daily.       simvastatin (ZOCOR) 20 MG tablet Take 1 tablet (20 mg total) by mouth daily. 90 tablet 3     acetaminophen (TYLENOL) 325 MG tablet Take 650 mg by mouth every 4 (four) hours as needed for pain.       aspirin 81 MG EC tablet Take 1 tablet (81 mg total) by mouth daily. 90 tablet 3     No current facility-administered medications for this visit.           Objective:    Vitals:    04/07/17 0654   BP: 128/60   Pulse: 72   Weight: 175 lb (79.4 kg)      Body mass index is 26.61 kg/(m^2).    Alert.  No apparent  distress with blood pressure 136/74 on recheck.  HEENT exam with mucocele left lower lip.  Bridge of nose with scaling noted consistent with actinic keratosis as well as noted acne rosacea.  Chest clear.  Cardiac exam regular with occasional ectopy.  Extremities warm and dry.  Right knee osteoarthritic changes noted.      Assessment:    1. Essential hypertension with goal blood pressure less than 140/90  Basic Metabolic Panel   2. Impaired fasting glucose  Basic Metabolic Panel   3. Cerebral infarction due to occlusion of left middle cerebral artery     4. Osteoarthritis Of The Knee  XR Knee Right 1 or 2 VWS    Ambulatory referral to Orthopedic Surgery   5. Hypertension  amLODIPine (NORVASC) 10 MG tablet   6. Mucocele of lip     7. Actinic keratosis     8. Medication monitoring encounter  CK Total         Plan:    Hypertension at goal.  Lipids at goal.  Check basic metabolic panel for fasting glucose with history of impaired fasting glucose as well as medication monitoring.  No evidence for myositis etc.  Will check CK level.  Repeat abdominal ultrasound at follow-up annual wellness visit 6 months.  Reassurance regarding mucocele.  Cryotherapy for actinic keratosis bridge of nose.  Declines further treatment for rosacea.  Right knee x-ray showing advanced osteoarthritis medial greater than lateral joint space.  Referral placed to [facility] for further evaluation and treatment options.

## 2021-06-13 NOTE — PROGRESS NOTES
Assessment and Plan:       1. Encounter for general adult medical examination with abnormal findings  Annual wellness visit completed.  Advanced directives were updated and scanned into electronic health record.  PHQ 9 questionnaire 1 out of 27.  Hearing loss noted with bilateral hearing aids utilized.  High-dose flu shot given otherwise immunizations reviewed and up-to-date.    2. Overweight (BMI 25.0-29.9)  Dietary and exercise modifications for weight goal less than 165 pounds ideally.    3. Dyslipidemia  Continue simvastatin 20 mg at bedtime.  Refill provided.  LFT monitoring completed.  Dietary and exercise modifications recommended as noted above.  - simvastatin (ZOCOR) 20 MG tablet; Take 1 tablet (20 mg total) by mouth daily.  Dispense: 90 tablet; Refill: 3  - Comprehensive Metabolic Panel  - Lipid Cascade    4. Essential hypertension with goal blood pressure less than 140/90  Refill on hydrochlorothiazide 25 mg daily.  Continue amlodipine 10 mg daily.  Basic metabolic panel for medication monitoring.  - hydroCHLOROthiazide (HYDRODIURIL) 25 MG tablet; Take 1 tablet (25 mg total) by mouth daily.  Dispense: 90 tablet; Refill: 3  - Comprehensive Metabolic Panel    5. Impaired fasting glucose  Check fasting glucose for goal less than 100 with history of impaired fasting glucose.  - Comprehensive Metabolic Panel    6. Osteoarthritis Of The Knee  Right greater than left knee osteoarthritis noted.  Status post cortisone injection through [facility] previously with benefit.  X-ray showing advanced osteoarthritis as noted below.    7. Cerebral infarction due to occlusion of left middle cerebral artery  Prior history of a left middle cerebral artery CVA January 2016.  No residual concerns identified.  Continue aggressive cardiovascular risk factor reduction.    8. Need for immunization against influenza  High-dose flu shot given.  Immunizations otherwise up-to-date.  - Influenza High Dose, Seasonal 65+  yrs    9. Aneurysm Of The Infrarenal Abdominal Aorta  Repeat abdominal ultrasound regarding infrarenal abdominal aorta aneurysm 3.2 cm with prior ultrasound 2 years ago.  - US Abdominal Aorta With Duplex Complete; Future         The patient's current medical problems were reviewed.    I have had an Advance Directives discussion with the patient.  The following high BMI interventions were performed this visit: encouragement to exercise, weight monitoring, weight loss from baseline weight and lifestyle education regarding diet.  .Weight goal < 165 pounds ideally.   The following health maintenance schedule was reviewed with the patient and provided in printed form in the after visit summary:   Health Maintenance   Topic Date Due     TD 18+ HE  08/14/2018     FALL RISK ASSESSMENT  04/07/2018     ADVANCE DIRECTIVES DISCUSSED WITH PATIENT  10/06/2022     PNEUMOCOCCAL POLYSACCHARIDE VACCINE AGE 65 AND OVER  Completed     INFLUENZA VACCINE RULE BASED  Completed     PNEUMOCOCCAL CONJUGATE VACCINE FOR ADULTS (PCV13 OR PREVNAR)  Completed     ZOSTER VACCINE  Completed        Subjective:   Chief Complaint: Daryl Casey is an 84 y.o. male here for an Annual Wellness visit.   HPI: Mr. Casey is seen today for annual wellness visit.  Doing well.  Mildly overweight.  History of dyslipidemia.  Underlying hypertension.  Impaired fasting glucose.  Prior left middle cerebral artery CVA January 2016.  No residual concerns at this time.  Right greater than left knee osteoarthritis present.  Has had 3.2 cm infrarenal abdominal aortic aneurysm previously noted on ultrasound 2 years ago.  Comprehensive review of systems as above otherwise all negative.  Completed advanced directives and has copy with today.  Continues bilateral hearing aid use.  No depression or anxiety.    Review of Systems:  Please see above.  The rest of the review of systems are negative for all systems.    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     Encephalopathy     PVC (premature ventricular contraction)     OA (osteoarthritis) of knee     Dyslipidemia     Past Medical History:   Diagnosis Date     Aneurysm of infrarenal abdominal aorta      BPH (benign prostatic hyperplasia)      Cataract     prior surgery     Diverticulosis      [place] (hard of hearing)      Hypertension      Insomnia      OA (osteoarthritis) of knee      PVC (premature ventricular contraction)      Right inguinal hernia       Past Surgical History:   Procedure Laterality Date     CATARACT EXTRACTION Bilateral      Eyelid Surgery Right      KNEE SURGERY Left      PROSTATE SURGERY       PROSTATE SURGERY        Family History   Problem Relation Age of Onset     Heart disease Mother      Diabetes Brother      Hypertension Brother       Social History     Social History     Marital status:      Spouse name: N/A     Number of children: N/A     Years of education: N/A     Occupational History     Not on file.     Social History Main Topics     Smoking status: Former Smoker     Quit date: 1/16/1965     Smokeless tobacco: Not on file     Alcohol use Yes      Comment: On occasions     Drug use: No     Sexual activity: Not on file     Other Topics Concern     Not on file     Social History Narrative      Current Outpatient Prescriptions   Medication Sig Dispense Refill     acetaminophen (TYLENOL) 325 MG tablet Take 650 mg by mouth every 4 (four) hours as needed for pain.       amLODIPine (NORVASC) 10 MG tablet TAKE ONE TABLET BY MOUTH ONCE DAILY 90 tablet 3      "ASCORBATE CALCIUM (VITAMIN C ORAL) Take 250 mg by mouth daily.        calcium, as carbonate, (OS-ALMA) 500 mg calcium (1,250 mg) tablet Take 1 tablet by mouth daily.        hydroCHLOROthiazide (HYDRODIURIL) 25 MG tablet Take 1 tablet (25 mg total) by mouth daily. 90 tablet 3     simvastatin (ZOCOR) 20 MG tablet Take 1 tablet (20 mg total) by mouth daily. 90 tablet 3     cyanocobalamin (VITAMIN B-12) 1000 MCG tablet Take 1,000 mcg by mouth daily. As directed.       folic acid (FOLVITE) 400 MCG tablet Take 400 mcg by mouth daily. As directed.       PYRIDOXINE HCL (VITAMIN B-6 ORAL) Take 1 tablet by mouth daily.       No current facility-administered medications for this visit.       Objective:   Vital Signs:   Visit Vitals     /70     Pulse (!) 56     Ht 5' 5.75\" (1.67 m)     Wt 172 lb (78 kg)     BMI 27.97 kg/m2        VisionScreening:  No exam data present     PHYSICAL EXAM    General Appearance:    Alert, cooperative, no distress, appears stated age.  Overweight.   Head:    Normocephalic, without obvious abnormality, atraumatic   Eyes:    PERRL, conjunctiva/corneas clear, EOM's intact, fundi     benign, both eyes.  Glasses.        Ears:    Normal TM's and external ear canals, both ears.  Bilateral hearing aids.   Nose:   Nares normal, septum midline, mucosa normal, no drainage    or sinus tenderness   Throat:   Lips, mucosa, and tongue normal; teeth and gums normal   Neck:   Supple, symmetrical, trachea midline, no adenopathy;        thyroid:  No enlargement/tenderness/nodules; no carotid    bruit or JVD   Back:     Symmetric, no curvature, ROM normal, no CVA tenderness   Lungs:     Clear to auscultation bilaterally, respirations unlabored   Chest wall:    No tenderness or deformity   Heart:    Regular rate and rhythm, S1 and S2 normal, no murmur, rub   or gallop   Abdomen:     Soft, non-tender, bowel sounds active all four quadrants,     no masses, no organomegaly.  Ventral hernia.   Genitalia:    Normal male " without lesion, discharge or tenderness.  No inguinal hernia noted.     Rectal:    Normal tone.  Prostate normal/symmetric, no masses or tenderness.   Extremities:   Extremities normal, atraumatic, no cyanosis or edema   Pulses:   2+ and symmetric all extremities   Skin:   Skin color, texture, turgor normal, no rashes or lesions   Lymph nodes:   Cervical, supraclavicular, and axillary nodes normal   Neurologic:   CNII-XII intact. Normal strength, sensation and reflexes       throughout        Assessment Results 10/6/2017   Activities of Daily Living No help needed   Instrumental Activities of Daily Living No help needed   Get Up and Go Score Less than 12 seconds   Mini Cog Total Score 4   Some recent data might be hidden     A Mini-Cog score of 0-2 suggests the possibility of dementia, score of 3-5 suggests no dementia    Identified Health Risks:     The patient was provided with written information regarding signs of hearing loss.  Patient's advanced directive was discussed and I am comfortable with the patient's wishes.      US CAROTID BILATERAL  1/27/2016 5:58 PM     INDICATION: CVA.  TECHNIQUE: Duplex exam performed utilizing 2D gray-scale imaging, Doppler interrogation with color-flow and spectral waveform analysis.  COMPARISON: None.     FINDINGS:  RIGHT: There is mild atheromatous plaque. Normal waveforms with no significant stenosis.     LEFT: There is mild atheromatous plaque. Normal waveforms with no significant stenosis.     Both vertebral arteries and subclavian artery waveforms are normal.     VELOCITY CHART:   The following velocities were obtained in the RIGHT carotid system.  CCA: 62/19 cm/s  ICA: 59/18 cm/s  ECA: 122/31 cm/s  ICA/CCA: PS 1     The following velocities were obtained in the LEFT carotid system.  CCA: 70/23 cm/s  ICA: 73/24 cm/s  ECA: 92/25 cm/s  ICA/CCA: PS 1      IMPRESSION:   CONCLUSION:  1.  Mild atheromatous plaque in the carotid arteries.  2.  No significant stenosis on either  side.     Evaluation based on velocities and NASCET criteria.            XR KNEE RIGHT 1 OR 2 VWS  4/7/2017 7:40 AM     INDICATION: Osteoarthritis of knee.  COMPARISON: None.     FINDINGS: Degenerative loss of joint space and marginal osteophyte formation is severe in the medial compartment and moderate to severe in the patellofemoral compartment. The lateral compartment is largely preserved. There appear to be small   intra-articular loose bodies within the posterior and medial aspects of the knee joint. Meniscal chondrocalcinosis is noted. No evidence for acute fracture or dislocation. Vascular calcifications throughout the leg.     This report was electronically interpreted by: Dr. Félix Wallace MD ON 04/07/2017 at 08:03        US ABDOMINAL AORTA WITH DUPLEX LIMITED  10/30/2015 7:26 AM     INDICATION: Abdominal aortic aneurysm, without rupture  TECHNIQUE: Ultrasound using gray-scale, two-dimensional images.  COMPARISON: 10/18/2013.     FINDINGS: There is mild atheromatous plaque in the abdominal aorta.     MEASUREMENTS:  Abdominal Aorta:   Proximal: 3.1 x 3.1 cm  Mid: 2.7 x 2.9 cm  Distal: 3.2 x 3.4 cm  Right Common Iliac Artery: 2 x 1.8 cm  Left Common Iliac Artery: 1.7 x 1.6 cm  maximum anterior posterior diameter of the infrarenal abdominal aortic aneurysm is 3.2 cm which is unchanged from the prior study. Craniocaudal extent is approximately 5.6 cm.  IMPRESSION:   CONCLUSION:  1.  Stable 3.2 cm anterior posterior with infrarenal abdominal aortic aneurysm.

## 2021-06-16 PROBLEM — N28.9 RENAL INSUFFICIENCY: Status: ACTIVE | Noted: 2018-10-12

## 2021-06-16 PROBLEM — R07.9 CHEST PAIN: Status: ACTIVE | Noted: 2020-02-16

## 2021-06-17 NOTE — PATIENT INSTRUCTIONS - HE
Patient Instructions by Darren Tripathi MD at 10/18/2019  7:40 AM     Author: Darren Tripathi MD Service: -- Author Type: Physician    Filed: 10/18/2019  8:36 AM Encounter Date: 10/18/2019 Status: Addendum    : Darren Tripathi MD (Physician)    Related Notes: Original Note by Darren Tripathi MD (Physician) filed at 10/18/2019  8:10 AM         Patient Education   Signs of Hearing Loss  Hearing loss is a problem shared by many people. In fact, it is one of the most common health conditions, particularly as people age. Most people over age 65 have some hearing loss, and by age 80, almost everyone does. Because hearing loss usually occurs slowly over the years, you may not realize your hearing ability has gotten worse.       Have your hearing checked  Contact your [facility] care provider if you:    Have to strain to hear normal conversation.    Have to watch other peoples faces very carefully to follow what theyre saying.    Need to ask people to repeat what theyve said.    Often misunderstand what people are saying.    Turn the volume of the television or radio up so high that others complain.    Feel that people are mumbling when theyre talking to you.    Find that the effort to hear leaves you feeling tired and irritated.    Notice, when using the phone, that you hear better with 1 ear than the other.    6188-0545 [employer]. [address] [place] [address]. All rights reserved. This information is not intended as a substitute for professional medical care. Always follow your healthcare professional's instructions.           Advance Directive  Patients advance directive was discussed and I am comfortable with the patients wishes.  Patient Education   Personalized Prevention Plan  You are due for the preventive services outlined below.  Your care team is available to assist you in scheduling these services.  If you have already completed any of these items, please share that  information with your care team to update in your medical record.  Health Maintenance   Topic Date Due   ? ZOSTER VACCINES (2 of 3) 02/26/2003   ? INFLUENZA VACCINE RULE BASED (1) 08/01/2019   ? MEDICARE ANNUAL WELLNESS VISIT  10/12/2019   ? FALL RISK ASSESSMENT  10/18/2020   ? ADVANCE CARE PLANNING  10/12/2023   ? TD 18+ HE  10/12/2028   ? PNEUMOCOCCAL IMMUNIZATION 65+ LOW/MEDIUM RISK  Completed

## 2021-06-17 NOTE — PROGRESS NOTES
"Assessment/Plan:    1. Essential hypertension with goal blood pressure less than 140/90  Hypertension, stable.  Blood pressure on recheck 118/66.  Check basic metabolic panel today for medication monitoring.  Reassess at follow-up in 6 months at annual wellness visit.  - Basic Metabolic Panel    2. Impaired fasting glucose  History of impaired fasting glucose with prior blood sugar 109 2017 while fasting.  Repeat fasting glucose and A1c today.  - Basic Metabolic Panel  - Glycosylated Hemoglobin A1c    3. Dyslipidemia  Continued use of simvastatin 20 mg at bedtime for lipid management.  - Lipid Cascade    4. 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    5. Osteoarthritis Of The Knee  Recent corticosteroid injection right knee with immediate benefit.  Osteoarthritis bilateral knees noted.  Followed by orthopedic specialist.          Subjective:    Daryl Casey is seen today for follow-up evaluation.  Known history of hypertension.  Continues amlodipine 10 mg daily and hydrochlorothiazide 25 mg daily.  History of impaired fasting glucose noted.  Dyslipidemia currently managed with simvastatin 20 mg at bedtime with history of prior CVA 2016 noted.  No residual concerns.  Did have one fall after light snow on top of ice outside of his garage otherwise no injury associated with this.  Bilateral knee osteoarthritis noted.  Right knee corticosteroid injection recently with immediate benefits.  Continues to follow with orthopedic specialist.  Comprehensive review of systems as above otherwise all negative.  No chest pain or palpitations.  No shortness of breath.  No orthopnea or PND symptoms.  No peripheral edema.     \"Kacey\" x [birth year]   5 children   Retired - product supervisor   Zeb   Mom - CHF dec 91   Dad - dec 75 ( in [facility])   3 bros - DM, HTN   Identical twin brother   No smoke  Infrequent " EtOH (wine) - glass on holidays   Hospitalizations: 3/03 - [facility] for rectal bleeding; 1/26/16 admitted to the hospital for confusion and aphasia and noted to have a left middle cerebral artery stroke  Surgeries: s/p left knee surgery ~ 40 years ago; s/p suprapubic prostatectomy 6/24/08 - pathology without cancer; s/p right inferior eyelid eyelash surgery; s/p kidney stone removal on cystoscopy in office by Dr. Martel 11/3/11; left eye cataract 5/31/12; right eye cataract 8/2/12; 1/23/15 bilateral blepharoplasty  Colonoscopy 3/24/03 (repeat in 10 years)   Uses bilateral hearing aids   3 cm infrarenal AAA noted on CT scan 11/3/2011 -> follow with repeat U/S monitoring in 2-3 years to ensure stable (ultrasound on 10/18/13 showing stable 3.2 cm infrarenal AAA)  H/O [facility] study     1/30/16 FYI: Please note that Mr. Casey was admitted to the hospital for confusion and aphasia and noted to have a left middle cerebral artery stroke. He is actually improving nicely and his aphasia is much improved he is going to go to [facility] for transitional care and will follow up with you afterwards. I did ask that [facility] follow while he is there. We ve kept his blood pressure medications the same, we added aspirin on a daily basis we also started him on a statin medication he will need his lipid profile checked in about six weeks. He will follow with Dr. Sneed in approximately four weeks. If you have any questions you can contact the hospitalist coordinator at [phone] and have me paged. Dr. Finney    Past Surgical History:   Procedure Laterality Date     CATARACT EXTRACTION Bilateral      Eyelid Surgery Right      KNEE SURGERY Left      PROSTATE SURGERY       PROSTATE SURGERY          Family History   Problem Relation Age of Onset     Heart disease Mother      Diabetes Brother      Hypertension Brother         Past Medical History:   Diagnosis Date     Aneurysm of  infrarenal abdominal aorta      BPH (benign prostatic hyperplasia)      Cataract     prior surgery     Diverticulosis      [place] (hard of hearing)      Hypertension      Insomnia      OA (osteoarthritis) of knee      PVC (premature ventricular contraction)      Right inguinal hernia         Social History   Substance Use Topics     Smoking status: Former Smoker     Quit date: 1/16/1965     Smokeless tobacco: Never Used     Alcohol use Yes      Comment: On occasions        Current Outpatient Prescriptions   Medication Sig Dispense Refill     acetaminophen (TYLENOL) 325 MG tablet Take 650 mg by mouth every 4 (four) hours as needed for pain.       amLODIPine (NORVASC) 10 MG tablet TAKE ONE TABLET BY MOUTH ONCE DAILY 90 tablet 3     ASCORBATE CALCIUM (VITAMIN C ORAL) Take 250 mg by mouth daily.        calcium, as carbonate, (OS-ALMA) 500 mg calcium (1,250 mg) tablet Take 1 tablet by mouth daily.        cyanocobalamin (VITAMIN B-12) 1000 MCG tablet Take 1,000 mcg by mouth daily. As directed.       folic acid (FOLVITE) 400 MCG tablet Take 400 mcg by mouth daily. As directed.       hydroCHLOROthiazide (HYDRODIURIL) 25 MG tablet Take 1 tablet (25 mg total) by mouth daily. 90 tablet 3     PYRIDOXINE HCL (VITAMIN B-6 ORAL) Take 1 tablet by mouth daily.       simvastatin (ZOCOR) 20 MG tablet Take 1 tablet (20 mg total) by mouth daily. 90 tablet 3     No current facility-administered medications for this visit.           Objective:    Vitals:    04/10/18 0700   BP: 120/60   Pulse: 60   Weight: 168 lb (76.2 kg)      Body mass index is 27.32 kg/(m^2).    Alert.  No apparent distress with blood pressure 118/66 on recheck.  Chest clear.  Cardiac exam with cardiac ectopy otherwise rate controlled.  Reviewed prior EKG from 1/26/16 with sinus arrhythmia and frequent PACs.  Abdomen benign.  Extremities warm and dry without peripheral edema      This note has been dictated using voice recognition software and as a result may contain minor  grammatical errors and unintended word substitutions.

## 2021-06-19 NOTE — LETTER
Letter by Darren Tripathi MD at      Author: Darren Tripathi MD Service: -- Author Type: --    Filed:  Encounter Date: 4/13/2019 Status: (Other)         Daryl Casey  [address]  [place] [address]             April 13, 2019         Dear Mr. Casey,    Below are the results from your recent visit:    Resulted Orders   Basic Metabolic Panel   Result Value Ref Range    Sodium 142 136 - 145 mmol/L    Potassium 4.1 3.5 - 5.0 mmol/L    Chloride 104 98 - 107 mmol/L    CO2 29 22 - 31 mmol/L    Anion Gap, Calculation 9 5 - 18 mmol/L    Glucose 99 70 - 125 mg/dL    Calcium 10.4 8.5 - 10.5 mg/dL    BUN 20 8 - 28 mg/dL    Creatinine 0.97 0.70 - 1.30 mg/dL    GFR MDRD Af Amer >60 >60 mL/min/1.73m2    GFR MDRD Non Af Amer >60 >60 mL/min/1.73m2    Narrative    Fasting Glucose reference range is 70-99 mg/dL per  American Diabetes Association (ADA) guidelines.   Glycosylated Hemoglobin A1c   Result Value Ref Range    Hemoglobin A1c 5.5 3.5 - 6.0 %   Lipid Cascade   Result Value Ref Range    Cholesterol 124 <=199 mg/dL    Triglycerides 88 <=149 mg/dL    HDL Cholesterol 47 >=40 mg/dL    LDL Calculated 59 <=129 mg/dL    Patient Fasting > 8hrs? Yes        No evidence for diabetes noted.     Your kidney function tests (i.e. Basic Metabolic Panel) were normal.     Your cholesterol results are at goal.  Continue your current medication as discussed.  Ensure ongoing efforts to achieve weight goal < 160 pounds initially, < 155 pounds ideally.     Please call with questions or contact us using [employer].    Sincerely,        Electronically signed by Darren Tripathi MD

## 2021-06-19 NOTE — LETTER
"Letter by Darren Tripathi MD at      Author: Darren Tripathi MD Service: -- Author Type: --    Filed:  Encounter Date: 10/21/2019 Status: Signed         Daryl Casey  [address]  [place] [address]             October 21, 2019         Dear Mr. Casey,    Below are the results from your recent visit:    Resulted Orders   Basic Metabolic Panel   Result Value Ref Range    Sodium 143 136 - 145 mmol/L    Potassium 4.0 3.5 - 5.0 mmol/L    Chloride 106 98 - 107 mmol/L    CO2 28 22 - 31 mmol/L    Anion Gap, Calculation 9 5 - 18 mmol/L    Glucose 109 70 - 125 mg/dL    Calcium 10.3 8.5 - 10.5 mg/dL    BUN 18 8 - 28 mg/dL    Creatinine 1.09 0.70 - 1.30 mg/dL    GFR MDRD Af Amer >60 >60 mL/min/1.73m2    GFR MDRD Non Af Amer >60 >60 mL/min/1.73m2    Narrative    Fasting Glucose reference range is 70-99 mg/dL per  American Diabetes Association (ADA) guidelines.   Lipid Cascade   Result Value Ref Range    Cholesterol 124 <=199 mg/dL    Triglycerides 78 <=149 mg/dL    HDL Cholesterol 49 >=40 mg/dL    LDL Calculated 59 <=129 mg/dL    Patient Fasting > 8hrs? Yes    Glycosylated Hemoglobin A1c   Result Value Ref Range    Hemoglobin A1c 5.5 3.5 - 6.0 %       Your labs suggest \"pre-diabetes\".  Goal fasting glucose is < 100.  Your fasting glucose was 109.  Goal \"average blood sugar\" (i.e. A1c) is < 5.7%.  Your A1c was 5.5%. Ensure regular exercise, healthy diet, and weight loss modifications in order to further improve.  Weight goal remains < 155 pounds ideally.    Your kidney function tests (i.e. Basic Metabolic Panel) were normal.     Your cholesterol results are at goal.  Continue your current medication as discussed.     Please call with questions or contact us using [employer].    Sincerely,        Electronically signed by Darren Tripathi MD       "

## 2021-06-20 NOTE — PROGRESS NOTES
Assessment and Plan:       1. Encounter for general adult medical examination with abnormal findings  Annual wellness visit completed.  Risks associated with known hearing loss.  Tetanus booster and high-dose flu shot provided today.  Shingrix immunization recommended through local pharmacy.  Annual wellness visits to continue.    2. Essential hypertension with goal blood pressure less than 140/90  Hypertension, stable continues amlodipine 10 mg daily and hydrochlorothiazide 25 mg daily.  Ensure stable renal function.  Refills provided on medication times 1 year.  - Basic Metabolic Panel  - amLODIPine (NORVASC) 10 MG tablet; Take 1 tablet (10 mg total) by mouth daily.  Dispense: 90 tablet; Refill: 3  - hydroCHLOROthiazide (HYDRODIURIL) 25 MG tablet; Take 1 tablet (25 mg total) by mouth daily.  Dispense: 90 tablet; Refill: 3    3. Impaired fasting glucose  History of impaired fasting glucose and will repeat A1c and fasting glucose today.  Therapeutic lifestyle change for weight goal less than 160 pounds initially, less than 155 pounds ideally.  - Basic Metabolic Panel  - Glycosylated Hemoglobin A1c    4. 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  Aspirin 81 mg daily.    5. Overweight (BMI 25.0-29.9)  Therapeutic lifestyle changes reviewed as noted above for weight goal less than 160 pounds initially, less than 155 pounds ideally.    6. Renal insufficiency  Prior renal insufficiency with creatinine 1.19 and GFR 58.  Recheck basic metabolic panel.  - Basic Metabolic Panel    7. Dyslipidemia  Check lipid cascade today while fasting continue simvastatin 20 mg at bedtime.  - Lipid Cascade  - simvastatin (ZOCOR) 20 MG tablet; Take 1 tablet (20 mg total) by mouth daily.  Dispense: 90 tablet; Refill: 3    8. Bilateral hearing loss, unspecified hearing loss type  Bilateral hearing loss.  Recently lost his left  hearing aid and will replace.    9. Osteoarthritis of both knees, unspecified osteoarthritis type  Follows with orthopedic specialist regarding bilateral knee osteoarthritis.    10. Need for vaccination  Immunization update provided.  - Influenza High Dose, Seasonal 65+ yrs  - Td, Preservative Free (green label)        The patient's current medical problems were reviewed.    I have had an Advance Directives discussion with the patient.  The following high BMI interventions were performed this visit: encouragement to exercise, weight monitoring, weight loss from baseline weight and lifestyle education regarding diet.  Ensure ongoing efforts to achieve weight goal < 160 pounds initially, < 155 pounds ideally.     The following health maintenance schedule was reviewed with the patient and provided in printed form in the after visit summary:   Health Maintenance   Topic Date Due     INFLUENZA VACCINE RULE BASED (1) 08/01/2018     TD 18+ HE  08/14/2018     FALL RISK ASSESSMENT  10/12/2019     ADVANCE DIRECTIVES DISCUSSED WITH PATIENT  10/06/2022     PNEUMOCOCCAL POLYSACCHARIDE VACCINE AGE 65 AND OVER  Completed     PNEUMOCOCCAL CONJUGATE VACCINE FOR ADULTS (PCV13 OR PREVNAR)  Completed     ZOSTER VACCINE  Completed        Subjective:     Chief Complaint: Daryl Casey is an 85 y.o. male here for an Annual Wellness visit.     HPI: Patient seen for annual wellness visit.  In general doing well.  Known history of hypertension and dyslipidemia.  Prior CVA January 2016 without residual concerns.  Known history of ventral hernia.  Bilateral knee osteoarthritis.  Utilizes hearing aids for bilateral hearing loss.  No recent concerns for kidney stone.  Denies chest pain or shortness of breath.  Small amount of swelling at ankles associate with amlodipine use.  Prior impaired fasting glucose history.  Tries to remain active daily however restriction with activity secondary to knee osteoarthritis.    Review of Systems:  Please  "see above.  The rest of the review of systems are negative for all systems.     \"Kacey\" x [birth year]   5 children   Retired - product supervisor   Zeb   Mom - CHF dec 91   Dad - dec 75 ( in [facility])   3 bros - DM, HTN   Identical twin brother   No smoke  Infrequent EtOH (wine) - glass on holidays   Hospitalizations: 3/03 - [facility] for rectal bleeding; 16 admitted to the hospital for confusion and aphasia and noted to have a left middle cerebral artery stroke  Surgeries: s/p left knee surgery ~ 40 years ago; s/p suprapubic prostatectomy 08 - pathology without cancer; s/p right inferior eyelid eyelash surgery; s/p kidney stone removal on cystoscopy in office by Dr. Martel 11/3/11; left eye cataract 12; right eye cataract 12; 1/23/15 bilateral blepharoplasty  Colonoscopy 3/24/03 (repeat in 10 years)   Uses bilateral hearing aids   3 cm infrarenal AAA noted on CT scan 11/3/2011 -> follow with repeat U/S monitoring in 2-3 years to ensure stable (ultrasound on 10/18/13 showing stable 3.2 cm infrarenal AAA)  [facility]     16 FYI: Please note that Mr. Casey was admitted to the hospital for confusion and aphasia and noted to have a left middle cerebral artery stroke. He is actually improving nicely and his aphasia is much improved he is going to go to [facility] for transitional care and will follow up with you afterwards. I did ask that [facility] follow while he is there. We ve kept his blood pressure medications the same, we added aspirin on a daily basis we also started him on a statin medication he will need his lipid profile checked in about six weeks. He will follow with Dr. Sneed in approximately four weeks. If you have any questions you can contact the hospitalist coordinator at [phone] and have me paged. Dr. 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     Encephalopathy     PVC (premature ventricular contraction)     Osteoarthritis of both knees, unspecified osteoarthritis type     Dyslipidemia     Renal insufficiency     Past Medical History:   Diagnosis Date     Aneurysm of infrarenal abdominal aorta (H)      BPH (benign prostatic hyperplasia)      Cataract     prior surgery     Diverticulosis      [place] (hard of hearing)      Hypertension      Insomnia      OA (osteoarthritis) of knee      PVC (premature ventricular contraction)      Right inguinal hernia       Past Surgical History:   Procedure Laterality Date     CATARACT EXTRACTION Bilateral      Eyelid Surgery Right      KNEE SURGERY Left      PROSTATE SURGERY       PROSTATE SURGERY        Family History   Problem Relation Age of Onset     Heart disease Mother      Diabetes Brother      Hypertension Brother       Social History     Social History     Marital status:      Spouse name: N/A     Number of children: N/A     Years of education: N/A     Occupational History     Not on file.     Social History Main Topics     Smoking status: Former Smoker     Quit date: 1/16/1965     Smokeless tobacco: Never Used     Alcohol use Yes      Comment: On occasions     Drug use: No     Sexual activity: Not on file     Other Topics Concern     Not on file     Social History Narrative      Current Outpatient Prescriptions   Medication Sig Dispense Refill     acetaminophen (TYLENOL) 325 MG tablet Take 650  "mg by mouth every 4 (four) hours as needed for pain.       amLODIPine (NORVASC) 10 MG tablet Take 1 tablet (10 mg total) by mouth daily. 90 tablet 3     ASCORBATE CALCIUM (VITAMIN C ORAL) Take 250 mg by mouth daily.        calcium, as carbonate, (OS-ALMA) 500 mg calcium (1,250 mg) tablet Take 1 tablet by mouth daily.        cyanocobalamin (VITAMIN B-12) 1000 MCG tablet Take 1,000 mcg by mouth daily. As directed.       hydroCHLOROthiazide (HYDRODIURIL) 25 MG tablet Take 1 tablet (25 mg total) by mouth daily. 90 tablet 3     multivitamin therapeutic tablet Take 1 tablet by mouth daily.       simvastatin (ZOCOR) 20 MG tablet Take 1 tablet (20 mg total) by mouth daily. 90 tablet 3     aspirin 81 mg chewable tablet Chew 1 tablet (81 mg total) daily. 30 tablet 11     No current facility-administered medications for this visit.       Objective:   Vital Signs:   Visit Vitals     /80     Pulse 84     Ht 5' 5.75\" (1.67 m)     Wt 164 lb (74.4 kg)     SpO2 95%     BMI 26.67 kg/m2        VisionScreening:  No exam data present     PHYSICAL EXAM    General Appearance:    Alert, cooperative, no distress, appears stated age.  Mildly overweight.   Head:    Normocephalic, without obvious abnormality, atraumatic   Eyes:    PERRL, conjunctiva/corneas clear, EOM's intact, fundi     benign, both eyes        Ears:    Normal TM's and external ear canals, both ears.  Right hearing aid in place (recently lost his left hearing aid while trimming a tree).   Nose:   Nares normal, septum midline, mucosa normal, no drainage    or sinus tenderness   Throat:   Lips, mucosa, and tongue normal; teeth and gums normal   Neck:   Supple, symmetrical, trachea midline, no adenopathy;        thyroid:  No enlargement/tenderness/nodules; no carotid    bruit or JVD   Back:     Symmetric, no curvature, ROM normal, no CVA tenderness   Lungs:     Clear to auscultation bilaterally, respirations unlabored   Chest wall:    No tenderness or deformity   Heart:    " Regular rate and rhythm, S1 and S2 normal, no murmur, rub   or gallop   Abdomen:     Soft, non-tender, bowel sounds active all four quadrants,     no masses, no organomegaly.  Ventral hernia, reducible.   Genitalia:    Normal male without lesion, discharge or tenderness.  No inguinal hernia noted.     Rectal:    Normal tone.  Prostate normal/symmetric, no masses or tenderness.   Extremities:   Extremities normal, atraumatic, no cyanosis.  Trace bilateral ankle edema   Pulses:   2+ and symmetric all extremities   Skin:   Skin color, texture, turgor normal, no rashes or lesions   Lymph nodes:   Cervical, supraclavicular, and axillary nodes normal   Neurologic:   CNII-XII intact. Normal strength, sensation and reflexes       throughout        Assessment Results 10/12/2018   Activities of Daily Living No help needed   Instrumental Activities of Daily Living No help needed   Get Up and Go Score Less than 12 seconds   Mini Cog Total Score 5   Some recent data might be hidden     A Mini-Cog score of 0-2 suggests the possibility of dementia, score of 3-5 suggests no dementia    Identified Health Risks:     The patient was provided with written information regarding signs of hearing loss.  Patient's advanced directive was discussed and I am comfortable with the patient's wishes.

## 2021-07-03 NOTE — ADDENDUM NOTE
Addendum Note by Greta Mata MD at 4/7/2017  7:58 AM     Author: Greta Mata MD Service: -- Author Type: Physician    Filed: 4/7/2017  7:58 AM Encounter Date: 4/7/2017 Status: Signed    : Greta Mata MD (Physician)    Addended by: GRETA MATA on: 4/7/2017 07:58 AM        Modules accepted: Orders